# Patient Record
Sex: FEMALE | Race: WHITE | NOT HISPANIC OR LATINO | URBAN - METROPOLITAN AREA
[De-identification: names, ages, dates, MRNs, and addresses within clinical notes are randomized per-mention and may not be internally consistent; named-entity substitution may affect disease eponyms.]

---

## 2017-02-14 ENCOUNTER — COMMUNICATION - HEALTHEAST (OUTPATIENT)
Dept: FAMILY MEDICINE | Facility: CLINIC | Age: 27
End: 2017-02-14

## 2017-02-14 DIAGNOSIS — G43.009 MIGRAINE WITHOUT AURA AND WITHOUT STATUS MIGRAINOSUS, NOT INTRACTABLE: ICD-10-CM

## 2017-02-17 ENCOUNTER — COMMUNICATION - HEALTHEAST (OUTPATIENT)
Dept: FAMILY MEDICINE | Facility: CLINIC | Age: 27
End: 2017-02-17

## 2017-02-20 ENCOUNTER — RECORDS - HEALTHEAST (OUTPATIENT)
Dept: ADMINISTRATIVE | Facility: OTHER | Age: 27
End: 2017-02-20

## 2017-02-23 ENCOUNTER — COMMUNICATION - HEALTHEAST (OUTPATIENT)
Dept: FAMILY MEDICINE | Facility: CLINIC | Age: 27
End: 2017-02-23

## 2017-02-23 DIAGNOSIS — G47.00 INSOMNIA: ICD-10-CM

## 2017-03-30 ENCOUNTER — OFFICE VISIT - HEALTHEAST (OUTPATIENT)
Dept: FAMILY MEDICINE | Facility: CLINIC | Age: 27
End: 2017-03-30

## 2017-03-30 DIAGNOSIS — E28.2 POLYCYSTIC OVARIES: ICD-10-CM

## 2017-03-30 DIAGNOSIS — G43.009 MIGRAINE WITHOUT AURA AND WITHOUT STATUS MIGRAINOSUS, NOT INTRACTABLE: ICD-10-CM

## 2017-03-30 DIAGNOSIS — F33.1 DEPRESSION, MAJOR, RECURRENT, MODERATE (H): ICD-10-CM

## 2017-03-30 ASSESSMENT — MIFFLIN-ST. JEOR: SCORE: 1210.4

## 2017-04-04 ENCOUNTER — AMBULATORY - HEALTHEAST (OUTPATIENT)
Dept: FAMILY MEDICINE | Facility: CLINIC | Age: 27
End: 2017-04-04

## 2017-06-20 ENCOUNTER — COMMUNICATION - HEALTHEAST (OUTPATIENT)
Dept: FAMILY MEDICINE | Facility: CLINIC | Age: 27
End: 2017-06-20

## 2017-06-20 DIAGNOSIS — G47.00 INSOMNIA: ICD-10-CM

## 2017-06-22 RX ORDER — GABAPENTIN 300 MG/1
CAPSULE ORAL
Qty: 270 CAPSULE | Refills: 2 | Status: SHIPPED | OUTPATIENT
Start: 2017-06-22

## 2017-07-20 ENCOUNTER — COMMUNICATION - HEALTHEAST (OUTPATIENT)
Dept: FAMILY MEDICINE | Facility: CLINIC | Age: 27
End: 2017-07-20

## 2017-07-20 DIAGNOSIS — G43.009 MIGRAINE WITHOUT AURA AND WITHOUT STATUS MIGRAINOSUS, NOT INTRACTABLE: ICD-10-CM

## 2017-08-12 ENCOUNTER — COMMUNICATION - HEALTHEAST (OUTPATIENT)
Dept: FAMILY MEDICINE | Facility: CLINIC | Age: 27
End: 2017-08-12

## 2017-08-12 DIAGNOSIS — G43.009 MIGRAINE WITHOUT AURA AND WITHOUT STATUS MIGRAINOSUS, NOT INTRACTABLE: ICD-10-CM

## 2017-09-14 ENCOUNTER — COMMUNICATION - HEALTHEAST (OUTPATIENT)
Dept: FAMILY MEDICINE | Facility: CLINIC | Age: 27
End: 2017-09-14

## 2017-09-14 DIAGNOSIS — F33.1 DEPRESSION, MAJOR, RECURRENT, MODERATE (H): ICD-10-CM

## 2017-10-23 ENCOUNTER — COMMUNICATION - HEALTHEAST (OUTPATIENT)
Dept: FAMILY MEDICINE | Facility: CLINIC | Age: 27
End: 2017-10-23

## 2017-10-23 DIAGNOSIS — E28.2 POLYCYSTIC OVARIES: ICD-10-CM

## 2017-10-23 DIAGNOSIS — G43.009 MIGRAINE WITHOUT AURA AND WITHOUT STATUS MIGRAINOSUS, NOT INTRACTABLE: ICD-10-CM

## 2017-10-27 ENCOUNTER — OFFICE VISIT - HEALTHEAST (OUTPATIENT)
Dept: FAMILY MEDICINE | Facility: CLINIC | Age: 27
End: 2017-10-27

## 2017-10-27 DIAGNOSIS — M54.2 NECK PAIN: ICD-10-CM

## 2017-10-27 DIAGNOSIS — M25.511 ACUTE PAIN OF BOTH SHOULDERS: ICD-10-CM

## 2017-10-27 DIAGNOSIS — M25.512 ACUTE PAIN OF BOTH SHOULDERS: ICD-10-CM

## 2017-10-27 DIAGNOSIS — I73.00 RAYNAUD'S DISEASE WITHOUT GANGRENE: ICD-10-CM

## 2017-10-27 DIAGNOSIS — M54.6 ACUTE MIDLINE THORACIC BACK PAIN: ICD-10-CM

## 2017-10-27 ASSESSMENT — MIFFLIN-ST. JEOR: SCORE: 1239.89

## 2017-11-15 ENCOUNTER — OFFICE VISIT - HEALTHEAST (OUTPATIENT)
Dept: FAMILY MEDICINE | Facility: CLINIC | Age: 27
End: 2017-11-15

## 2017-11-15 ENCOUNTER — COMMUNICATION - HEALTHEAST (OUTPATIENT)
Dept: FAMILY MEDICINE | Facility: CLINIC | Age: 27
End: 2017-11-15

## 2017-11-15 DIAGNOSIS — F33.1 DEPRESSION, MAJOR, RECURRENT, MODERATE (H): ICD-10-CM

## 2017-11-15 DIAGNOSIS — Z23 NEEDS FLU SHOT: ICD-10-CM

## 2017-11-15 DIAGNOSIS — I73.00 RAYNAUD'S DISEASE WITHOUT GANGRENE: ICD-10-CM

## 2017-11-15 DIAGNOSIS — E28.2 POLYCYSTIC OVARIES: ICD-10-CM

## 2017-11-15 DIAGNOSIS — G43.009 MIGRAINE WITHOUT AURA AND WITHOUT STATUS MIGRAINOSUS, NOT INTRACTABLE: ICD-10-CM

## 2017-11-15 DIAGNOSIS — G47.00 INSOMNIA: ICD-10-CM

## 2017-11-15 RX ORDER — AMLODIPINE BESYLATE 2.5 MG/1
2.5 TABLET ORAL DAILY
Qty: 60 TABLET | Refills: 0 | Status: SHIPPED | OUTPATIENT
Start: 2017-11-15

## 2017-11-15 RX ORDER — NORGESTIMATE AND ETHINYL ESTRADIOL 0.25-0.035
KIT ORAL
Qty: 84 TABLET | Refills: 3 | Status: SHIPPED | OUTPATIENT
Start: 2017-11-15

## 2017-11-15 ASSESSMENT — MIFFLIN-ST. JEOR: SCORE: 1228.55

## 2017-12-04 ENCOUNTER — AMBULATORY - HEALTHEAST (OUTPATIENT)
Dept: FAMILY MEDICINE | Facility: CLINIC | Age: 27
End: 2017-12-04

## 2018-01-17 ENCOUNTER — COMMUNICATION - HEALTHEAST (OUTPATIENT)
Dept: FAMILY MEDICINE | Facility: CLINIC | Age: 28
End: 2018-01-17

## 2018-01-17 DIAGNOSIS — G43.009 MIGRAINE WITHOUT AURA AND WITHOUT STATUS MIGRAINOSUS, NOT INTRACTABLE: ICD-10-CM

## 2018-01-17 RX ORDER — RIZATRIPTAN BENZOATE 10 MG/1
TABLET ORAL
Qty: 18 TABLET | Refills: 1 | Status: SHIPPED | OUTPATIENT
Start: 2018-01-17

## 2018-01-22 ENCOUNTER — OFFICE VISIT - HEALTHEAST (OUTPATIENT)
Dept: FAMILY MEDICINE | Facility: CLINIC | Age: 28
End: 2018-01-22

## 2018-01-22 DIAGNOSIS — Z91.09 ENVIRONMENTAL ALLERGIES: ICD-10-CM

## 2018-01-22 DIAGNOSIS — G43.909 MIGRAINE: ICD-10-CM

## 2018-01-22 DIAGNOSIS — I73.00 RAYNAUD PHENOMENON: ICD-10-CM

## 2018-01-22 RX ORDER — ZOLMITRIPTAN 5 MG/1
5 TABLET, FILM COATED ORAL PRN
Qty: 18 TABLET | Refills: 1 | Status: SHIPPED | OUTPATIENT
Start: 2018-01-22

## 2018-01-22 ASSESSMENT — MIFFLIN-ST. JEOR: SCORE: 1228.55

## 2018-01-24 LAB
A ALTERNATA IGE QN: <0.35 KU/L
A FUMIGATUS IGE QN: <0.35 KU/L
C HERBARUM IGE QN: <0.35 KU/L
CAT DANDER IGG QN: <0.35 KU/L
COCKSFOOT IGE QN: <0.35 KU/L
COMMON RAGWEED IGE QN: <0.35 KU/L
COTTONWOOD IGE QN: <0.35 KU/L
D FARINAE IGE QN: <0.35 KU/L
D PTERONYSS IGE QN: <0.35 KU/L
DOG DANDER+EPITH IGE QN: <0.35 KU/L
KENT BLUE GRASS IGE QN: <0.35 KU/L
MAPLE IGE QN: <0.35 KU/L
ROACH IGE QN: <0.35 KU/L
SILVER BIRCH IGE QN: <0.35 KU/L
TIMOTHY IGE QN: <0.35 KU/L
TOTAL IGE - HISTORICAL: 6.65 KU/L (ref 0–100)
WHITE ASH IGE QN: <0.35 KU/L
WHITE ELM IGE QN: <0.35 KU/L
WHITE OAK IGE QN: <0.35 KU/L

## 2018-02-07 ENCOUNTER — COMMUNICATION - HEALTHEAST (OUTPATIENT)
Dept: FAMILY MEDICINE | Facility: CLINIC | Age: 28
End: 2018-02-07

## 2021-05-24 ENCOUNTER — RECORDS - HEALTHEAST (OUTPATIENT)
Dept: ADMINISTRATIVE | Facility: CLINIC | Age: 31
End: 2021-05-24

## 2021-05-25 ENCOUNTER — RECORDS - HEALTHEAST (OUTPATIENT)
Dept: ADMINISTRATIVE | Facility: CLINIC | Age: 31
End: 2021-05-25

## 2021-05-27 ENCOUNTER — RECORDS - HEALTHEAST (OUTPATIENT)
Dept: ADMINISTRATIVE | Facility: CLINIC | Age: 31
End: 2021-05-27

## 2021-05-30 ENCOUNTER — RECORDS - HEALTHEAST (OUTPATIENT)
Dept: ADMINISTRATIVE | Facility: CLINIC | Age: 31
End: 2021-05-30

## 2021-05-30 VITALS — BODY MASS INDEX: 18.49 KG/M2 | HEIGHT: 65 IN | WEIGHT: 111 LBS

## 2021-05-31 VITALS — HEIGHT: 65 IN | BODY MASS INDEX: 19.58 KG/M2 | WEIGHT: 117.5 LBS

## 2021-05-31 VITALS — WEIGHT: 115 LBS | BODY MASS INDEX: 19.16 KG/M2 | HEIGHT: 65 IN

## 2021-05-31 VITALS — WEIGHT: 115 LBS | HEIGHT: 65 IN | BODY MASS INDEX: 19.16 KG/M2

## 2021-06-09 NOTE — PROGRESS NOTES
ASSESSMENT/PLAN:     1. Depression, major, recurrent, moderate  PHQ 9 score is 17.  Patient denies intrusive thoughts of hurting herself or others.  She is familiar with medication for depression and knows that it works for her.  - sertraline (ZOLOFT) 50 MG tablet; Take 1 tablet (50 mg total) by mouth daily.  Dispense: 30 tablet; Refill: 2    2. Migraine without aura and without status migrainosus, not intractable  Pamelor works well in helping to prevent migraines.  - nortriptyline (PAMELOR) 10 MG capsule; TAKE ONE CAPSULE BY MOUTH NIGHTLY AT BEDTIME  Dispense: 30 capsule; Refill: 2    3. Polycystic Ovarian Syndrome  I am happy to place her on birth control pills.  This will help improve symptoms from her PCO S.  She has no need for contraception at this time.  - norgestimate-ethinyl estradiol (SPRINTEC, 28,) 0.25-35 mg-mcg per tablet; Take 1 tablet by mouth daily.  Dispense: 28 tablet; Refill: 5    There are no Patient Instructions on file for this visit.  Medications Discontinued During This Encounter   Medication Reason     naproxen (NAPROSYN) 500 MG tablet Therapy completed     traMADol (ULTRAM) 50 mg tablet Therapy completed     nortriptyline (PAMELOR) 10 MG capsule Reorder     sertraline (ZOLOFT) 100 MG tablet Dose adjustment     Return in about 3 months (around 6/30/2017) for Recheck. Sooner, if any problems or fails to improve.        CHIEF COMPLAINT  Chief Complaint   Patient presents with     Follow-up     Lymph nodes     Depression     Restart of meds. Previous meds did not help       HPI:  Anne Pacheco is a 27 y.o. female presenting to the clinic today to follow up and discuss depression. She is normally seen by Dr. Gallo for care. She stopped taking her depression medication after about a month of taking them because her father told her that she should not be taking so much medication. She states at that time, she was not having much trouble with anxiety or depression after she visited for her  physical in December 2016. She has not been taking her medications for over three months. She recently restarted some of her medications, although she does not remember why some of them were prescribed. She has started taking one 300 mg of Neurontin each night and two 4 mg tizanidine each night to help her sleep. She has also been taking 200 mg of celecoxib. She states that she is not sure if she has depression or if there is something else wrong with her. She feels like none of the medications she has taken have been effective for her. She had a brain injury and met with a therapist during this time, but she does not remember if this is helpful. She believes she will feel better when she begins her job next week. Her PHQ-9 is 17 today.     Polycystic Ovarian Syndrome: She believes that she does not have the right hormone balance, and thinks that it is getting worse. Her period is more normal than it ever has been. She has been experiencing more acne and facial hair growth. She has taken progesterone to induce her period in the past because she was not getting a menstrual cycle for 4 months at a time. She is interested in beginning birth control, although she notes that she is not sexually active. She was on birth control in her early 20s, but believes there may be a reason that she is not supposed to be taking birth control. She saw a specialist while she was in school at [employer] about eight years ago who told her that she had a hormone imbalance.     Migraines: Although she stopped taking her other medications, she continues taking Maxalt for her migraines. She normally gets 18 migraines a month, but states she only got 15 this month. She states that the Maxalt helps resolved her headaches almost immediately. She has not been taking nortriptyline. She was originally prescribed gabapentin at night for fibromyalgia. She states that she had an x-ray done on her neck and it was thought the gabapentin could  help with her headaches.       REVIEW OF SYSTEMS:   She continues to have swollen lymph nodes, and notes that she feels lymph nodes in places they should not be. All other systems negative.     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 She is beginning training next week. At her last job, she worked nights, but she will be working days at this job. She is living at home with her parents. She was living in an apartment, but moved out following her concussion. She had a surgeon remove a large amount of lymph nodes on the right side of her neck. She ended up getting an abscess and had to have a drain placed. She has a 4 year old daughter named Kallie.    Patient Active Problem List   Diagnosis     Osteoarthrosis     Pap Smear (+) Low Grade Squamous Intraepithelial Lesion     Polycystic Ovarian Syndrome     Cannabis Abuse     Amenorrhea     Vitamin C Deficiency     Raynaud's Phenomenon     Esophageal Reflux     Fibromyalgia     Post concussive syndrome     Depression, major, recurrent, moderate     Insomnia     Migraine without aura and without status migrainosus, not intractable     Allergic rhinitis     Ovarian cyst, left     Adjustment disorder with mixed anxiety and depressed mood       TOBACCO USE:  History   Smoking Status     Former Smoker     Quit date: 2/16/2016   Smokeless Tobacco     Never Used     Social History     Social History     Marital status: Single     Spouse name: N/A     Number of children: 1     Years of education: N/A     Occupational History     Not on file.     Social History Main Topics     Smoking status: Former Smoker     Quit date: 2/16/2016     Smokeless tobacco: Never Used     Alcohol use No     Drug use: No     Sexual activity: Not Currently     Partners: Female     Birth control/ protection: None     Other Topics Concern     Not on file     Social History Narrative       OBJECTIVE:       Vitals:    03/30/17 1319   BP: 98/72   Pulse: 80   SpO2: 98%     Weight: 111 lb (50.3  kg)  Wt Readings from Last 3 Encounters:   03/30/17 111 lb (50.3 kg)   12/13/16 109 lb (49.4 kg)   12/05/16 110 lb (49.9 kg)     Body mass index is 18.61 kg/(m^2).      Physical Exam:  GENERAL APPEARANCE: A&A, NAD, well hydrated, well nourished  NECK: Supple, no thyroid mass. There are no lymph nodes in her upper right neck, anterior chain. The normal ones are found in the lower neck.   CV: RRR, no M/G/R   LUNGS: CTAB, normal respiratory effort  Skin: Patient has facial acne with increased facial hair.  PSYCHIATRIC;  Mood appropriate, memory intact.  Very pleasant young woman with a flattened affect but good eye contact and she does have a social smile.      Emely Chandler MD    ADDITIONAL HISTORY SUMMARIZED (2): None.  DECISION TO OBTAIN EXTRA INFORMATION (1): None.   RADIOLOGY TESTS (1): None.  LABS (1): None.  MEDICINE TESTS (1): None.  INDEPENDENT REVIEW (2 each): None.     The visit lasted a total of 31 minutes face to face with the patient. Over 50% of the time was spent counseling and educating the patient about depression.    I, Carina Bedoya, am scribing for and in the presence of, Dr. Chandler.    I, Dr. Chandler, personally performed the services described in this documentation, as scribed by Carina Bedoya in my presence, and it is both accurate and complete.       MEDICATIONS   Current Outpatient Prescriptions   Medication Sig Dispense Refill     celecoxib (CELEBREX) 200 MG capsule Take 1 capsule (200 mg total) by mouth 2 (two) times a day. (Patient taking differently: Take 200 mg by mouth at bedtime. ) 60 capsule 5     gabapentin (NEURONTIN) 300 MG capsule Take 1 capsule (300 mg total) by mouth 3 (three) times a day. (Patient taking differently: Take 300 mg by mouth at bedtime. ) 270 capsule 3     rizatriptan (MAXALT) 10 MG tablet TAKE 1 TABLET (10 MG TOTAL) BY MOUTH AS NEEDED FOR MIGRAINE. MAY REPEAT IN 2 HOURS IF NEEDED 18 tablet 2     tiZANidine (ZANAFLEX) 4 MG tablet TAKE 2 TABLET BY MOUTH  NIGHTLY AT BEDTIME 60 tablet 5     fluticasone (FLONASE) 50 mcg/actuation nasal spray 1 spray into each nostril daily. 16 g 5     norgestimate-ethinyl estradiol (SPRINTEC, 28,) 0.25-35 mg-mcg per tablet Take 1 tablet by mouth daily. 28 tablet 5     nortriptyline (PAMELOR) 10 MG capsule TAKE ONE CAPSULE BY MOUTH NIGHTLY AT BEDTIME 30 capsule 2     sertraline (ZOLOFT) 50 MG tablet Take 1 tablet (50 mg total) by mouth daily. 30 tablet 2     No current facility-administered medications for this visit.          Total data points: 0

## 2021-06-13 NOTE — PROGRESS NOTES
1. Acute pain of both shoulders     2. Neck pain     3. Acute midline thoracic back pain     4. Raynaud's disease without gangrene  amLODIPine (NORVASC) 2.5 MG tablet     Med list reconciled with patient    ASSESSMENT/PLAN:     Body Mass Index was not assessed due to normal BMI.    1. Acute pain of both shoulders    -Patient instructed to rest, ice the affected area several times per day for 10 minutes at a time, may use compression wrap to the area if pain and swelling occur and elevated the affected area whenever patient is able. May take Tylenol 1000 mg four times per day or Ibuprofen 800 mg three times daily for pain and inflammation.       2. Neck pain    -Patient instructed to rest, ice the affected area several times per day for 10 minutes at a time, may use compression wrap to the area if pain and swelling occur and elevated the affected area whenever patient is able. May take Tylenol 1000 mg four times per day or Ibuprofen 800 mg three times daily for pain and inflammation.       3. Acute midline thoracic back pain    -Patient instructed to rest, ice the affected area several times per day for 10 minutes at a time, may use compression wrap to the area if pain and swelling occur and elevated the affected area whenever patient is able. May take Tylenol 1000 mg four times per day or Ibuprofen 800 mg three times daily for pain and inflammation.         4. Raynaud's disease without gangrene    - amLODIPine (NORVASC) 2.5 MG tablet; Take 1 tablet (2.5 mg total) by mouth daily.  Dispense: 60 tablet; Refill: 0    Work note written for patient stating that she has no restrictions and can return to work immediately.  Refilled patient's Norvasc for her ray nods as she experiences worsening symptoms in the wintertime.  Patient declined physical therapy today.    The visit lasted a total of 25 minutes face to face with the patient.  Over 50% of the time spent counseling and educating the patient about the above content.       Perla Adams NP          SUBJECTIVE:  Anne Pacheco is a 27 y.o. female presents with bilateral shoulder, thoracic back pain and neck pain that started 2 days ago.  She woke up with this pain.  She describes it as a constant, nerve pain.  Aggravating factors: Sitting up, driving and picking up her children.  Relieving factors: Patient takes muscle relaxers and gabapentin for her fibromyalgia.  She has also been using Biofreeze to the areas that have been bothering her.  She is rating her pain a 3 out of 10 today.  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 She denies any recent trauma, falls or intense exercise.   She has a rather flat affect and answers with abrupt yes and no making it rather difficult to collect medical information from her.  Her emerald 7 score is 2 and PHQ 9 score is 1 today.  When we discussed her work note, she does not feel that she needs to be in any work restrictions.  I am happy to write this in her letter for her.  She is requesting a refill of her amlodipine today.  Patient uses this during the winter for her Raynaud's disease.  Chief Complaint   Patient presents with     Note     Note clearing pt to go back to work         Patient Active Problem List   Diagnosis     Osteoarthrosis     Pap Smear (+) Low Grade Squamous Intraepithelial Lesion     Polycystic Ovarian Syndrome     Cannabis Abuse     Amenorrhea     Vitamin C Deficiency     Raynaud's Phenomenon     Esophageal Reflux     Fibromyalgia     Post concussive syndrome     Depression, major, recurrent, moderate     Insomnia     Migraine without aura and without status migrainosus, not intractable     Allergic rhinitis     Ovarian cyst, left     Adjustment disorder with mixed anxiety and depressed mood       Current Outpatient Prescriptions   Medication Sig Dispense Refill     celecoxib (CELEBREX) 200 MG capsule TAKE 1 CAPSULE (200 MG TOTAL) BY MOUTH 2 (TWO) TIMES A DAY.  60 capsule 0     fluticasone (FLONASE) 50 mcg/actuation nasal spray 1 spray into each nostril daily. 16 g 5     gabapentin (NEURONTIN) 300 MG capsule TAKE ONE CAPSULE BY MOUTH THREE TIMES DAILY 270 capsule 2     nortriptyline (PAMELOR) 10 MG capsule TAKE ONE CAPSULE BY MOUTH NIGHTLY AT BEDTIME 30 capsule 2     rizatriptan (MAXALT) 10 MG tablet TAKE 1 TABLET BY MOUTH EVERY 2 HOURS AS NEEDED FOR MIGRAINE, MAX 2 DOSES IN 24 HRS. 18 tablet 1     sertraline (ZOLOFT) 50 MG tablet Take 1 tablet (50 mg total) by mouth daily. 30 tablet 0     SPRINTEC, 28, 0.25-35 mg-mcg per tablet TAKE 1 TABLET BY MOUTH DAILY. 84 tablet 0     tiZANidine (ZANAFLEX) 4 MG tablet TAKE 2 TABLET BY MOUTH NIGHTLY AT BEDTIME 60 tablet 5     amLODIPine (NORVASC) 2.5 MG tablet Take 1 tablet (2.5 mg total) by mouth daily. 60 tablet 0     No current facility-administered medications for this visit.        History   Smoking Status     Former Smoker     Quit date: 2/16/2016   Smokeless Tobacco     Never Used       REVIEW OF SYSTEMS: Denies radiation, saddle anesthesia, weakness, loss of bowel/bladder control.      TOBACCO USE:  History   Smoking Status     Former Smoker     Quit date: 2/16/2016   Smokeless Tobacco     Never Used     Social History     Social History     Marital status: Single     Spouse name: N/A     Number of children: 1     Years of education: N/A     Occupational History     Not on file.     Social History Main Topics     Smoking status: Former Smoker     Quit date: 2/16/2016     Smokeless tobacco: Never Used     Alcohol use No     Drug use: No     Sexual activity: Not Currently     Partners: Female     Birth control/ protection: None     Other Topics Concern     Not on file     Social History Narrative         OBJECTIVE:            Vitals:    10/27/17 1526   BP: 110/74   Pulse: 77   Resp: 14   Temp: 99.4  F (37.4  C)   SpO2: 98%     Weight: 117 lb 8 oz (53.3 kg)  Wt Readings from Last 3 Encounters:   10/27/17 117 lb 8 oz (53.3 kg)    03/30/17 111 lb (50.3 kg)   12/13/16 109 lb (49.4 kg)     Body mass index is 19.7 kg/(m^2).        Physical Exam:  GENERAL APPEARANCE: A&A, NAD, well hydrated, well nourished  SKIN:  Normal skin turgor, no lesions/rashes   NECK: Supple, without lymphadenopathy, no thyroid mass, no JVD, no bruit  CV: RRR, no M/G/R   LUNGS: CTAB, normal respiratory effort  ABDOMEN: S&NT, no masses, no organomegaly, BS present x4   EXTREMITY: no edema, full range of motion with bilateral shoulders, neck and back.  PSYCHIATRIC;  Mood appropriate, memory intact, flat affect, becomes irritable easily

## 2021-06-14 NOTE — PROGRESS NOTES
PROGRESS NOTE       SUBJECTIVE:  Anne Pacheco is a 27 y.o. female   Chief Complaint   Patient presents with     [facility]     Medication Refill     med check      Scoliosis     was told by chiropractor that pt had scoliosis      Patient is here today for medication check.  She was also told by a chiropractor that she has scoliosis.  She was not aware of this and really does not have prolonged back problems.    I reviewed all her medications as follows:  Patient has not started taking the amlodipine 2.5 mg daily which was prescribed to her for her rainouts phenomenon.  She will let me know how this works out for her.  Patient takes Celebrex 200 mg sometimes at night and twice daily if she is experiencing more back and neck pain.  Currently is not using Flonase but she uses it for allergies when needed.  Patient takes gabapentin 300 mg 3 times daily for neck and shoulder pain.  Nortriptyline 10 mg at night seems to help daily headaches.  Maxalt 10 mg works well for migraine and she still gets them frequently.  Usually more than 15 per month.  She gets relief from the Maxalt and rarely needs to take 2 in 24 hours.  Sertraline 50 mg 1 daily keeps her mood stable and she does need a refill of this.  Patient has taken birth control pills in the past just for estrogen supplementation.  She states that her hormone levels have been low in estrogen and higher in testosterone.  She has not been taking the pills recently and would like a prescription.  Tizanidine works for muscle spasm.  She takes 2 tablets at night before bed.    Patient Active Problem List   Diagnosis     Osteoarthrosis     Pap Smear (+) Low Grade Squamous Intraepithelial Lesion     Polycystic Ovarian Syndrome     Cannabis Abuse     Amenorrhea     Vitamin C Deficiency     Raynaud's Phenomenon     Esophageal Reflux     Fibromyalgia     Post concussive syndrome     Depression, major, recurrent, moderate     Insomnia     Migraine without aura and  without status migrainosus, not intractable     Allergic rhinitis     Ovarian cyst, left     Adjustment disorder with mixed anxiety and depressed mood       Current Outpatient Prescriptions   Medication Sig Dispense Refill     celecoxib (CELEBREX) 200 MG capsule TAKE 1 CAPSULE (200 MG TOTAL) BY MOUTH 2 (TWO) TIMES A DAY. 180 capsule 3     gabapentin (NEURONTIN) 300 MG capsule TAKE ONE CAPSULE BY MOUTH THREE TIMES DAILY 270 capsule 2     nortriptyline (PAMELOR) 10 MG capsule TAKE ONE CAPSULE BY MOUTH NIGHTLY AT BEDTIME 30 capsule 5     rizatriptan (MAXALT) 10 MG tablet TAKE 1 TABLET BY MOUTH EVERY 2 HOURS AS NEEDED FOR MIGRAINE, MAX 2 DOSES IN 24 HRS. 18 tablet 1     sertraline (ZOLOFT) 50 MG tablet Take 1 tablet (50 mg total) by mouth daily. 90 tablet 3     tiZANidine (ZANAFLEX) 4 MG tablet TAKE 2 TABLET BY MOUTH NIGHTLY AT BEDTIME 60 tablet 5     amLODIPine (NORVASC) 2.5 MG tablet Take 1 tablet (2.5 mg total) by mouth daily. 60 tablet 0     fluticasone (FLONASE) 50 mcg/actuation nasal spray 1 spray into each nostril daily. 16 g 5     norgestimate-ethinyl estradiol (SPRINTEC, 28,) 0.25-35 mg-mcg per tablet TAKE 1 TABLET BY MOUTH DAILY. 84 tablet 3     No current facility-administered medications for this visit.        History   Smoking Status     Former Smoker     Quit date: 2/16/2016   Smokeless Tobacco     Never Used       REVIEW OF SYSTEMS:  Patient denies fever, chills, dizziness, headache, visual change, cough, chest pain, shortness of breath, abdominal pain, extremity pain or swelling.  She complains of chronic migraine headaches, rainouts phenomenon, neck, shoulder, and back pain and spasm.        OBJECTIVE:       Vitals:    11/15/17 1118   BP: 98/62   Pulse: 64     Weight: 115 lb (52.2 kg)  Wt Readings from Last 3 Encounters:   11/15/17 115 lb (52.2 kg)   10/27/17 117 lb 8 oz (53.3 kg)   03/30/17 111 lb (50.3 kg)     Body mass index is 19.29 kg/(m^2).        Physical Exam:  GENERAL APPEARANCE: A&A, NAD, well  hydrated, well nourished  NECK: Supple, without lymphadenopathy, no thyroid mass  CV: RRR, no M/G/R   LUNGS: CTAB, normal respiratory effort  EXTREMITY: Extremities normal, atraumatic, no swelling  NEURO: no gross deficits   PSYCHIATRIC:  Mood appropriate, memory intact  Back: I see no evidence of scoliosis.      ASSESSMENT/PLAN:     1. Insomnia    - tiZANidine (ZANAFLEX) 4 MG tablet; TAKE 2 TABLET BY MOUTH NIGHTLY AT BEDTIME  Dispense: 60 tablet; Refill: 5    2. Polycystic Ovarian Syndrome    - norgestimate-ethinyl estradiol (SPRINTEC, 28,) 0.25-35 mg-mcg per tablet; TAKE 1 TABLET BY MOUTH DAILY.  Dispense: 84 tablet; Refill: 3    3. Depression, major, recurrent, moderate    - sertraline (ZOLOFT) 50 MG tablet; Take 1 tablet (50 mg total) by mouth daily.  Dispense: 90 tablet; Refill: 3    4. Migraine without aura and without status migrainosus, not intractable    - rizatriptan (MAXALT) 10 MG tablet; TAKE 1 TABLET BY MOUTH EVERY 2 HOURS AS NEEDED FOR MIGRAINE, MAX 2 DOSES IN 24 HRS.  Dispense: 18 tablet; Refill: 1  - nortriptyline (PAMELOR) 10 MG capsule; TAKE ONE CAPSULE BY MOUTH NIGHTLY AT BEDTIME  Dispense: 30 capsule; Refill: 5  - celecoxib (CELEBREX) 200 MG capsule; TAKE 1 CAPSULE (200 MG TOTAL) BY MOUTH 2 (TWO) TIMES A DAY.  Dispense: 180 capsule; Refill: 3    5. Raynaud's disease without gangrene    - amLODIPine (NORVASC) 2.5 MG tablet; Take 1 tablet (2.5 mg total) by mouth daily.  Dispense: 60 tablet; Refill: 0    6. Needs flu shot    - Influenza, Seasonal,Quad Inj, 36+ MOS      There are no Patient Instructions on file for this visit.  Medications Discontinued During This Encounter   Medication Reason     tiZANidine (ZANAFLEX) 4 MG tablet Reorder     SPRINTEC, 28, 0.25-35 mg-mcg per tablet Reorder     sertraline (ZOLOFT) 50 MG tablet Reorder     rizatriptan (MAXALT) 10 MG tablet Reorder     nortriptyline (PAMELOR) 10 MG capsule Reorder     celecoxib (CELEBREX) 200 MG capsule Reorder     amLODIPine (NORVASC)  2.5 MG tablet Reorder     Return in about 6 months (around 5/15/2018) for Annual physical, medication check.    The visit lasted a total of 30 minutes face to face with the patient.  Over 50% of the time spent counseling and educating the patient about all of the above.      Emely Chandler MD

## 2021-06-15 NOTE — PROGRESS NOTES
" PROGRESS NOTE       SUBJECTIVE:  Anne Pacheco is a 28 y.o. female   Chief Complaint   Patient presents with     Migraine     pt having headache for the last few months off and on, meds aren't working as well    Anne states that she almost always has a headache.  She used to get only a few per year.  Her migraines began at the age of 11.  She did not get her menses until age 16.  She wonders if there is some relationship to birth control pills.  She has been on them to straighten out her cycles and it worked.  Now that her cycles are better she went off but she still has daily headaches.  (She does not require birth control)    Patient states her migraines are \"really bad\".  They have been daily for months.  She states the rescue pill used to work and she would only have a headache for 24 hours.  Now she gets relief for 1-2 hours and then it comes back.  Nortriptyline does not seem to help to prevent them.  She is wondering if something else would be beneficial.    Patient was diagnosed with severe rainouts phenomenon and is been on amlodipine 2.5 mg daily since.  She wonders if she should continue this.  I advised that she should.    Patient also wonders if she has dog allergies because after grooming a dog she developed an itchy rash on the inside of her right arm.      Patient Active Problem List   Diagnosis     Osteoarthrosis     Pap Smear (+) Low Grade Squamous Intraepithelial Lesion     Polycystic Ovarian Syndrome     Cannabis Abuse     Amenorrhea     Vitamin C Deficiency     Raynaud's disease     Esophageal Reflux     Fibromyalgia     Post concussive syndrome     Depression, major, recurrent, moderate     Insomnia     Migraine without aura and without status migrainosus, not intractable     Allergic rhinitis     Ovarian cyst, left     Adjustment disorder with mixed anxiety and depressed mood     Environmental allergies       Current Outpatient Prescriptions   Medication Sig Dispense Refill     " gabapentin (NEURONTIN) 300 MG capsule TAKE ONE CAPSULE BY MOUTH THREE TIMES DAILY 270 capsule 2     norgestimate-ethinyl estradiol (SPRINTEC, 28,) 0.25-35 mg-mcg per tablet TAKE 1 TABLET BY MOUTH DAILY. 84 tablet 3     rizatriptan (MAXALT) 10 MG tablet TAKE 1 TABLET BY MOUTH EVERY 2 HOURS AS NEEDED FOR MIGRAINE, MAX 2 DOSES IN 24 HRS. 18 tablet 1     sertraline (ZOLOFT) 50 MG tablet Take 1 tablet (50 mg total) by mouth daily. 90 tablet 3     amLODIPine (NORVASC) 2.5 MG tablet Take 1 tablet (2.5 mg total) by mouth daily. 60 tablet 0     fluticasone (FLONASE) 50 mcg/actuation nasal spray 1 spray into each nostril daily. 16 g 5     ZOLMitriptan (ZOMIG) 5 MG tablet Take 1 tablet (5 mg total) by mouth as needed for migraine. 18 tablet 1     No current facility-administered medications for this visit.        History   Smoking Status     Former Smoker     Quit date: 2/16/2016   Smokeless Tobacco     Never Used       REVIEW OF SYSTEMS:  Patient denies fever, chills, dizziness, headache, visual change, cough, chest pain, shortness of breath, abdominal pain, extremity pain or swelling.          OBJECTIVE:       Vitals:    01/22/18 0924   BP: 115/70   Pulse: 64     Weight: 115 lb (52.2 kg)  Wt Readings from Last 3 Encounters:   01/22/18 115 lb (52.2 kg)   11/15/17 115 lb (52.2 kg)   10/27/17 117 lb 8 oz (53.3 kg)     Body mass index is 19.29 kg/(m^2).        Physical Exam:  GENERAL APPEARANCE: A&A, NAD, well hydrated, well nourished  SKIN:  Normal skin turgor, no lesions/rashes.  Currently there is no rash inside her right arm.  NECK: Supple, without lymphadenopathy, no thyroid mass  CV: RRR, no M/G/R   LUNGS: CTAB, normal respiratory effort  EXTREMITY: Extremities normal, atraumatic, no swelling  NEURO: no gross deficits   PSYCHIATRIC:  Mood appropriate, memory intact        ASSESSMENT/PLAN:     1. Environmental allergies  Because of her history of environmental allergies and no previous allergy testing I have recommended a  Rast panel to see what she may be allergic to.  - IgE Allergen Panel Respiratory with Total IgE ($$$)    2. Raynaud phenomenon  Continue the amlodipine 2.5 mg daily.    3. Migraine  Her migraines sound intractable.  Sometimes switching the triptan medication can be beneficial so I did this for her.  Otherwise I am recommending a complete evaluation by neurology.  Patient agrees with the plan.  - ZOLMitriptan (ZOMIG) 5 MG tablet; Take 1 tablet (5 mg total) by mouth as needed for migraine.  Dispense: 18 tablet; Refill: 1  - Ambulatory referral to Neurology      There are no Patient Instructions on file for this visit.  Medications Discontinued During This Encounter   Medication Reason     tiZANidine (ZANAFLEX) 4 MG tablet Ineffective     nortriptyline (PAMELOR) 10 MG capsule Ineffective     azithromycin (ZITHROMAX Z-REBEKAH) 250 MG tablet Therapy completed     celecoxib (CELEBREX) 200 MG capsule Therapy completed     Return for After she sees neurology..    The visit lasted a total of 28 minutes face to face with the patient.  Over 50% of the time spent counseling and educating the patient about all of the above.      Emely Chandler MD

## 2021-06-16 PROBLEM — Z91.09 ENVIRONMENTAL ALLERGIES: Status: ACTIVE | Noted: 2018-01-22

## 2021-06-27 ENCOUNTER — HEALTH MAINTENANCE LETTER (OUTPATIENT)
Age: 31
End: 2021-06-27

## 2021-10-17 ENCOUNTER — HEALTH MAINTENANCE LETTER (OUTPATIENT)
Age: 31
End: 2021-10-17

## 2022-07-23 ENCOUNTER — HEALTH MAINTENANCE LETTER (OUTPATIENT)
Age: 32
End: 2022-07-23

## 2022-10-01 ENCOUNTER — HEALTH MAINTENANCE LETTER (OUTPATIENT)
Age: 32
End: 2022-10-01

## 2023-08-12 ENCOUNTER — HEALTH MAINTENANCE LETTER (OUTPATIENT)
Age: 33
End: 2023-08-12